# Patient Record
Sex: FEMALE | Race: AMERICAN INDIAN OR ALASKA NATIVE
[De-identification: names, ages, dates, MRNs, and addresses within clinical notes are randomized per-mention and may not be internally consistent; named-entity substitution may affect disease eponyms.]

---

## 2019-08-06 ENCOUNTER — HOSPITAL ENCOUNTER (EMERGENCY)
Dept: HOSPITAL 5 - ED | Age: 49
Discharge: HOME | End: 2019-08-06
Payer: MEDICAID

## 2019-08-06 VITALS — SYSTOLIC BLOOD PRESSURE: 99 MMHG | DIASTOLIC BLOOD PRESSURE: 47 MMHG

## 2019-08-06 DIAGNOSIS — Z79.899: ICD-10-CM

## 2019-08-06 DIAGNOSIS — Z86.2: ICD-10-CM

## 2019-08-06 DIAGNOSIS — M54.30: Primary | ICD-10-CM

## 2019-08-06 LAB
BILIRUB UR QL STRIP: (no result)
BLOOD UR QL VISUAL: (no result)
MUCOUS THREADS #/AREA URNS HPF: (no result) /HPF
PH UR STRIP: 5 [PH] (ref 5–7)
PROT UR STRIP-MCNC: (no result) MG/DL
RBC #/AREA URNS HPF: 3 /HPF (ref 0–6)
UROBILINOGEN UR-MCNC: < 2 MG/DL (ref ?–2)
WBC #/AREA URNS HPF: 1 /HPF (ref 0–6)

## 2019-08-06 PROCEDURE — 81001 URINALYSIS AUTO W/SCOPE: CPT

## 2019-08-06 PROCEDURE — 81025 URINE PREGNANCY TEST: CPT

## 2019-08-06 PROCEDURE — 99283 EMERGENCY DEPT VISIT LOW MDM: CPT

## 2019-08-06 NOTE — EVENT NOTE
ED Screening Note


Date of service: 08/06/19


Time: 15:01


ED Screening Note: 


49 y/o female comes in for back and lower abd spasms times 4 days. Urination 

relieves spasms. No Meds NKDA, PHM None. No vag discharge LMP 8/1/19. 





This initial assessment/diagnostic orders/clinical plan/treatment(s) is/are 

subject to change based on patients health status, clinical progression and re-

assessment by fellow clinical providers in the ED. Further treatment and workup 

at subsequent clinical providers discretion. Patient/guardian urged not to elope

from the ED as their condition may be serious if not clinically assessed and 

managed. 





Initial orders include:

## 2019-08-06 NOTE — EMERGENCY DEPARTMENT REPORT
HPI





- General


Chief Complaint: Abdominal Pain


Time Seen by Provider: 08/06/19 15:00





- HPI


HPI: 





PT COMES TO ER TODAY WITH L SIDE PAIN RADIATING DOWN HER L LEG. MOVEMENT MAKES 

PAIN WORSE. LYING STILL MAKES IT BETTER. SHE HAS TAKEN NOTHING AT HOME. PAIN IS 

A SHOOTING PAIN. 





NO DYSURIA. NO VAG DISCHARGE. NO SEX IN 4 YEARS. 


NO N/V/D.


PT IS CONSTIPATED FROM HER IRON. 





ED Past Medical Hx





- Past Medical History


Previous Medical History?: Yes


Hx Congestive Heart Failure: No


Hx COPD: No


Additional medical history: ANEMIA





- Surgical History


Past Surgical History?: No





- Family History


Family history: no significant





- Social History


Smoking Status: Never Smoker


Substance Use Type: None





- Medications


Home Medications: 


                                Home Medications











 Medication  Instructions  Recorded  Confirmed  Last Taken  Type


 


Ferrous Sulfate [Iron Supplement] 1 tab PO DAILY 10/25/14 10/25/14 10/24/14 

22:00 History





    1 


 


Cyclobenzaprine [Flexeril] 10 mg PO TID PRN #10 tablet 08/06/19  Unknown Rx


 


Cyclobenzaprine [Flexeril] 10 mg PO TID PRN #12 tablet 08/06/19  Unknown Rx


 


Polyethylene Glycol 3350 [Miralax] 119 gm PO DAILY #30 day 08/06/19  Unknown Rx


 


Sennosides/Docusate Sodium [Senna 1 each PO BID #60 tablet 08/06/19  Unknown Rx





Plus Tablet]     


 


predniSONE [Deltasone] 20 mg PO DAILY #5 tablet 08/06/19  Unknown Rx














ED Review of Systems


ROS: 


Stated complaint: ABD/BACK PAIN/SPASM


Other details as noted in HPI





Comment: All other systems reviewed and negative





Physical Exam





- Physical Exam


Vital Signs: 


                                   Vital Signs











  08/06/19





  15:00


 


Temperature 98.2 F


 


Pulse Rate 89


 


Respiratory 18





Rate 


 


Blood Pressure 99/47


 


O2 Sat by Pulse 98





Oximetry 











Physical Exam: 





ALERT AND ORIENTED


NO FOCAL DEF


ABD SNT


NO CVA TENDERNESS


NO SPINE TENDERNESS- NO TRAUMA


S1S2


LUNGS CTA


POS STRAIGHT LEG RAISE ON LEFT





ED Course


                                   Vital Signs











  08/06/19





  15:00


 


Temperature 98.2 F


 


Pulse Rate 89


 


Respiratory 18





Rate 


 


Blood Pressure 99/47


 


O2 Sat by Pulse 98





Oximetry 














ED Medical Decision Making





- Medical Decision Making





                                   Lab Results











  08/06/19 Range/Units





  15:30 


 


Urine Color  Yellow  (Yellow)  


 


Urine Turbidity  Clear  (Clear)  


 


Urine pH  5.0  (5.0-7.0)  


 


Ur Specific Gravity  1.026  (1.003-1.030)  


 


Urine Protein  <15 mg/dl  (Negative)  mg/dL


 


Urine Glucose (UA)  Neg  (Negative)  mg/dL


 


Urine Ketones  Neg  (Negative)  mg/dL


 


Urine Blood  Neg  (Negative)  


 


Urine Nitrite  Neg  (Negative)  


 


Ur Reducing Substances  Not Reportable  


 


Urine Bilirubin  Neg  (Negative)  


 


Urine Ictotest  Not Reportable  


 


Urine Urobilinogen  < 2.0  (<2.0)  mg/dL


 


Ur Leukocyte Esterase  Neg  (Negative)  


 


Urine WBC (Auto)  1.0  (0.0-6.0)  /HPF


 


Urine RBC (Auto)  3.0  (0.0-6.0)  /HPF


 


U Epithel Cells (Auto)  2.0  (0-13.0)  /HPF


 


Urine Mucus  1+  /HPF


 


Urine HCG, Qual  Negative  (Negative)  








                                   Vital Signs











  08/06/19





  15:00


 


Temperature 98.2 F


 


Pulse Rate 89


 


Respiratory 18





Rate 


 


Blood Pressure 99/47


 


O2 Sat by Pulse 98





Oximetry 








UA NOTED


PREG NEG





POS STRAIGHT LEG RAISE.





DISCUSSED WITH PT SCIATICA AND MANAGEMENT OF HER CONSTIPATION DUE TO HER IRON





WILL DC HOME WITH DC PLAN OF CARE AND FOLLOW UP WITH PCP





- Differential Diagnosis


RO UTI/PREG


Critical care attestation.: 


If time is entered above; I have spent that time in minutes in the direct care 

of this critically ill patient, excluding procedure time.








ED Disposition


Clinical Impression: 


 Constipation, Sciatica





Disposition: DC-01 TO HOME OR SELFCARE


Is pt being admited?: No


Does the pt Need Aspirin: No


Condition: Stable


Instructions:  Lumbar Radiculopathy (ED), Constipation (ED)


Additional Instructions: 


TAKE IRON DAILY


MEDS AS ORDERED TODAY WITH IT FOR CONSTIPATION





MEDS FOR SCIATICA AS WE DISCUSSED


WARM BATHS WILL HELP





GOOD BODY MECHANICS





DIET AND ACTIVITY AS TOLERATED





FOLLOW UP WITH ORTHO MD FOR BACK


REFERRAL BELOW





FOLLOW UP WITH PCP FOR YOUR ANEMIA/MEDICAL NEEDS


REFERRAL BELOW








Prescriptions: 


predniSONE [Deltasone] 20 mg PO DAILY #5 tablet


Cyclobenzaprine [Flexeril] 10 mg PO TID PRN #10 tablet


 PRN Reason: Muscle Spasm


Cyclobenzaprine [Flexeril] 10 mg PO TID PRN #12 tablet


 PRN Reason: Muscle Spasm


Polyethylene Glycol 3350 [Miralax] 119 gm PO DAILY #30 day


Sennosides/Docusate Sodium [Senna Plus Tablet] 1 each PO BID #60 tablet


Referrals: 


[email or web address],ONWURA, MD [Staff Physician] - 3-5 Days


[email or web address] FIERRO MD [Staff Physician] - 3-5 Days


Time of Disposition: 16:18